# Patient Record
Sex: MALE | Race: WHITE | NOT HISPANIC OR LATINO | Employment: OTHER | URBAN - METROPOLITAN AREA
[De-identification: names, ages, dates, MRNs, and addresses within clinical notes are randomized per-mention and may not be internally consistent; named-entity substitution may affect disease eponyms.]

---

## 2018-04-12 RX ORDER — HYDROCHLOROTHIAZIDE 50 MG/1
50 TABLET ORAL DAILY
Refills: 3 | COMMUNITY
Start: 2018-02-11

## 2018-04-16 ENCOUNTER — OFFICE VISIT (OUTPATIENT)
Dept: UROLOGY | Facility: MEDICAL CENTER | Age: 78
End: 2018-04-16
Payer: MEDICARE

## 2018-04-16 VITALS
HEIGHT: 70 IN | SYSTOLIC BLOOD PRESSURE: 142 MMHG | WEIGHT: 245 LBS | DIASTOLIC BLOOD PRESSURE: 80 MMHG | BODY MASS INDEX: 35.07 KG/M2

## 2018-04-16 DIAGNOSIS — N40.1 BPH WITH OBSTRUCTION/LOWER URINARY TRACT SYMPTOMS: Primary | ICD-10-CM

## 2018-04-16 DIAGNOSIS — N13.8 BPH WITH OBSTRUCTION/LOWER URINARY TRACT SYMPTOMS: Primary | ICD-10-CM

## 2018-04-16 DIAGNOSIS — Z87.898 HISTORY OF ELEVATED PROSTATE SPECIFIC ANTIGEN (PSA): ICD-10-CM

## 2018-04-16 LAB
POST-VOID RESIDUAL VOLUME, ML POC: 138 ML
SL AMB  POCT GLUCOSE, UA: NEGATIVE
SL AMB LEUKOCYTE ESTERASE,UA: NEGATIVE
SL AMB POCT BILIRUBIN,UA: NEGATIVE
SL AMB POCT BLOOD,UA: ABNORMAL
SL AMB POCT CLARITY,UA: CLEAR
SL AMB POCT COLOR,UA: YELLOW
SL AMB POCT KETONES,UA: NEGATIVE
SL AMB POCT NITRITE,UA: NEGATIVE
SL AMB POCT PH,UA: 7
SL AMB POCT SPECIFIC GRAVITY,UA: 1.01
SL AMB POCT URINE PROTEIN: ABNORMAL
SL AMB POCT UROBILINOGEN: 0.2

## 2018-04-16 PROCEDURE — 99214 OFFICE O/P EST MOD 30 MIN: CPT | Performed by: UROLOGY

## 2018-04-16 PROCEDURE — 81003 URINALYSIS AUTO W/O SCOPE: CPT | Performed by: UROLOGY

## 2018-04-16 PROCEDURE — 51798 US URINE CAPACITY MEASURE: CPT | Performed by: UROLOGY

## 2018-04-16 RX ORDER — TAMSULOSIN HYDROCHLORIDE 0.4 MG/1
0.4 CAPSULE ORAL
COMMUNITY

## 2018-04-16 RX ORDER — DOXAZOSIN 2 MG/1
2 TABLET ORAL
COMMUNITY

## 2018-04-16 NOTE — PROGRESS NOTES
[address] [facility]  [facility]  [address] [facility] [place]  [place], [address] [place] [address]  [phone]  www  [facility]  org      NAME: Brennan Camargo  AGE: 70 y o  SEX: male  : 1940   MRN: [id number]    DATE: 2018  TIME: 9:32 AM    Assessment and Plan:  BPH with obstruction, and he has significant lower urinary tract symptoms but I am afraid to increase his alpha blockers because of his breathing  We will continue with the Proscar and check a PSA in July  We will send the results to him from there  Otherwise follow-up with me in 1 year  Continue with the Proscar and doxazosin  Some of his symptoms are from his asthma medications and the severe lower extremity edema that he is suffering  Chief Complaint   No chief complaint on file  History of Present Illness   Long history of BPH, microscopic hematuria and elevated PSA  Underwent cystoscopy in 2015 which showed a bladder diverticulum and a large prostate with friable vessels  He was placed on Proscar in the past and his PSA dropped from 5 45-2  86  Last PSA was 2017 and it was 2 94  He remains on Proscar and doxazosin  He works from 11-7 5 nights a week  He has difficulties with the stream   Occasionally has a good stream, but the rest the time he gets urgency and kind of dribbles out  PVR is 138 cc  The following portions of the patient's history were reviewed and updated as appropriate: allergies, current medications, past family history, past medical history, past social history, past surgical history and problem list     Review of Systems   Review of Systems   Respiratory: Positive for shortness of breath and wheezing  Cardiovascular: Positive for leg swelling  Genitourinary: Positive for decreased urine volume, difficulty urinating, frequency and urgency   Negative for discharge, dysuria, flank pain, hematuria, penile pain, penile swelling, scrotal swelling and testicular pain  Active Problem List   There is no problem list on file for this patient  Objective   There were no vitals taken for this visit  Physical Exam   Constitutional: He is oriented to person, place, and time  He appears well-developed and well-nourished  HENT:   Head: Normocephalic and atraumatic  Eyes: EOM are normal    Neck: Normal range of motion  Pulmonary/Chest: Effort normal    Musculoskeletal: He exhibits edema  Neurological: He is alert and oriented to person, place, and time  Skin: Skin is warm and dry  Psychiatric: He has a normal mood and affect   His behavior is normal  Judgment and thought content normal            Current Medications     Current Outpatient Prescriptions:     ADVAIR DISKUS 250-50 MCG/DOSE inhaler, Inhale 2 (two) times a day Rinse mouth after use, Disp: , Rfl: 3    hydrochlorothiazide (HYDRODIURIL) 50 mg tablet, Take 50 mg by mouth daily, Disp: , Rfl: 3    PROAIR  (90 Base) MCG/ACT inhaler, Inhale 2 puffs every 4 - 6 hours as needed, Disp: , Rfl: 6        Gary Narayan MD

## 2018-04-16 NOTE — LETTER
2018     Mayo Diaz MD  [address]    Patient: Tasha Baldwin   YOB: 1940   Date of Visit: 2018       Dear Dr Joesph Louis: Thank you for referring Tarun Sifuentes to me for evaluation  Below are my notes for this consultation  If you have questions, please do not hesitate to call me  I look forward to following your patient along with you  Sincerely,        Paola العلي MD        CC: No Recipients  Paola العلي MD  2018  9:50 AM  Sign at close encounter  [address] [facility]  [address]  [phone]  www  [facility]  org      NAME: Brennan Camargo  AGE: 70 y o  SEX: male  : 1940   MRN: [id number]    DATE: 2018  TIME: 9:32 AM    Assessment and Plan:  BPH with obstruction, and he has significant lower urinary tract symptoms but I am afraid to increase his alpha blockers because of his breathing  We will continue with the Proscar and check a PSA in July  We will send the results to him from there  Otherwise follow-up with me in 1 year  Continue with the Proscar and doxazosin  Some of his symptoms are from his asthma medications and the severe lower extremity edema that he is suffering  Chief Complaint   No chief complaint on file  History of Present Illness   Long history of BPH, microscopic hematuria and elevated PSA  Underwent cystoscopy in 2015 which showed a bladder diverticulum and a large prostate with friable vessels  He was placed on Proscar in the past and his PSA dropped from 5 45-2  86  Last PSA was 2017 and it was 2 94  He remains on Proscar and doxazosin  He works from 11-7 5 nights a week  He has difficulties with the stream   Occasionally has a good stream, but the rest the time he gets urgency and kind of dribbles out  PVR is 138 cc      The following portions of the patient's history were reviewed and updated as appropriate: allergies, current medications, past family history, past medical history, past social history, past surgical history and problem list     Review of Systems   Review of Systems   Respiratory: Positive for shortness of breath and wheezing  Cardiovascular: Positive for leg swelling  Genitourinary: Positive for decreased urine volume, difficulty urinating, frequency and urgency  Negative for discharge, dysuria, flank pain, hematuria, penile pain, penile swelling, scrotal swelling and testicular pain  Active Problem List   There is no problem list on file for this patient  Objective   There were no vitals taken for this visit  Physical Exam   Constitutional: He is oriented to person, place, and time  He appears well-developed and well-nourished  HENT:   Head: Normocephalic and atraumatic  Eyes: EOM are normal    Neck: Normal range of motion  Pulmonary/Chest: Effort normal    Musculoskeletal: He exhibits edema  Neurological: He is alert and oriented to person, place, and time  Skin: Skin is warm and dry  Psychiatric: He has a normal mood and affect   His behavior is normal  Judgment and thought content normal            Current Medications     Current Outpatient Prescriptions:     ADVAIR DISKUS 250-50 MCG/DOSE inhaler, Inhale 2 (two) times a day Rinse mouth after use, Disp: , Rfl: 3    hydrochlorothiazide (HYDRODIURIL) 50 mg tablet, Take 50 mg by mouth daily, Disp: , Rfl: 3    PROAIR  (90 Base) MCG/ACT inhaler, Inhale 2 puffs every 4 - 6 hours as needed, Disp: , Rfl: 6        Truong Mccoy MD

## 2019-04-19 RX ORDER — ACETAMINOPHEN 500 MG
500 TABLET ORAL EVERY 6 HOURS PRN
COMMUNITY
Start: 2019-01-28

## 2019-04-19 RX ORDER — BENZONATATE 100 MG/1
100 CAPSULE ORAL 3 TIMES DAILY PRN
COMMUNITY
Start: 2019-01-28

## 2019-04-19 RX ORDER — FINASTERIDE 5 MG/1
5 TABLET, FILM COATED ORAL
COMMUNITY
Start: 2019-01-29

## 2019-04-19 RX ORDER — BISACODYL 10 MG
10 SUPPOSITORY, RECTAL RECTAL
COMMUNITY
Start: 2019-01-28

## 2019-04-19 RX ORDER — BUDESONIDE 0.5 MG/2ML
0.5 INHALANT ORAL
COMMUNITY
Start: 2019-01-28

## 2019-04-19 RX ORDER — HYDROXYZINE HYDROCHLORIDE 25 MG/1
25 TABLET, FILM COATED ORAL 3 TIMES DAILY PRN
COMMUNITY
Start: 2019-02-27

## 2019-04-19 RX ORDER — PANTOPRAZOLE SODIUM 40 MG/1
40 TABLET, DELAYED RELEASE ORAL
COMMUNITY
Start: 2019-02-28

## 2019-04-19 RX ORDER — GUAIFENESIN 600 MG
600 TABLET, EXTENDED RELEASE 12 HR ORAL
COMMUNITY
Start: 2019-01-28